# Patient Record
(demographics unavailable — no encounter records)

---

## 2024-12-06 NOTE — HISTORY OF PRESENT ILLNESS
[FreeTextEntry1] :  58 year old male patient seen in follow-up with LUTS primarily frequency and urgency. He also complains of erectile dysfunction with inability to keep erections although he gets reasonable erections - they go away within 5 mins or so. He has tried Alfuzosin and found it to be of some help with frequency and stream in the beginning. In the last 2 weeks, did not notice any difference. He drinks 1-2 cups of coffee per day. Denies hematuria or dysuria. He came in to do a flow today, however he only voided 37 mL and his PVR was 0.

## 2024-12-06 NOTE — ASSESSMENT
[FreeTextEntry1] :  58 year old male patient with urinary frequency, BPH, erectile dysfunction, borderline PSA of 2.5.  We discussed these issues at length. I have asked him to obtain another PSA level in late January prior to seeing me in February. If greater than 2.5, I think we will proceed with an MRI. We discussed the issues regarding prostate cancer. For his frequency, I will change him to Cialis as he also has the erectile dysfunction. We will try daily Cialis to see if this will help BPH related frequency and urgency and the erectile dysfunction. We will discontinue the Alfuzosin. All of this was discussed with him. He understands and will follow-up as scheduled. Total time=30 min.   The submitted E/M billing level for this visit reflects the total time spent on the day of the visit including face-to-face time spent with the patient, non-face-to-face review of medical records and relevant information, documentation, and asynchronous communication with the patient after a visit via phone, email, or patients EHR portal after the visit. The medical records reviewed are either scanned into the chart or reviewed with the patient using a patients electronic medical records portal for patients with records not available to [facility] via electronic transmission platforms from other institutions and labs. Time spent counseling and performing coordination of care was also included in determining the appropriate EM billing level.   I have reviewed and verified information regarding the chief complaint and history recorded by the ancillary staff and/or the patient. I have independently reviewed and interpreted tests performed by other physicians and facilities as necessary.   I have discussed with the patient differential diagnosis, reason for auxiliary tests if ordered, risks, benefits, alternatives, and complications of each form of therapy were discussed.  I personally performed the services described in the documentation, reviewed the documentation recorded by the scribe in my presence, and it accurately and completely records my words and actions.

## 2024-12-06 NOTE — ASSESSMENT
[FreeTextEntry1] :  58 year old male patient with urinary frequency, BPH, erectile dysfunction, borderline PSA of 2.5.  We discussed these issues at length. I have asked him to obtain another PSA level in late January prior to seeing me in February. If greater than 2.5, I think we will proceed with an MRI. We discussed the issues regarding prostate cancer. For his frequency, I will change him to Cialis as he also has the erectile dysfunction. We will try daily Cialis to see if this will help BPH related frequency and urgency and the erectile dysfunction. We will discontinue the Alfuzosin. All of this was discussed with him. He understands and will follow-up as scheduled. Total time=30 min.   The submitted E/M billing level for this visit reflects the total time spent on the day of the visit including face-to-face time spent with the patient, non-face-to-face review of medical records and relevant information, documentation, and asynchronous communication with the patient after a visit via phone, email, or patients EHR portal after the visit. The medical records reviewed are either scanned into the chart or reviewed with the patient using a patients electronic medical records portal for patients with records not available to [facility] via electronic transmission platforms from other institutions and labs. Time spent counseling and performing coordination of care was also included in determining the appropriate EM billing level.   I have reviewed and verified information regarding the chief complaint and history recorded by the ancillary staff and/or the patient. I have independently reviewed and interpreted tests performed by other physicians and facilities as necessary.   I have discussed with the patient differential diagnosis, reason for auxiliary tests if ordered, risks, benefits, alternatives, and complications of each form of therapy were discussed.  I personally performed the services described in the documentation, reviewed the documentation recorded by the scribe in my presence, and it accurately and completely records my words and actions.

## 2024-12-06 NOTE — ADDENDUM
[FreeTextEntry1] :  CANDY FABIAN, am scribing for and in the presence of  in the following sections: HISTORY OF PRESENT ILLNESS, PAST MEDICAL/FAMILY/SOCIAL HISTORY, REVIEW OF SYSTEMS, VITAL SIGNS, PHYSICAL EXAM, ASSESSMENT/PLAN on 11/26/2024.